# Patient Record
Sex: MALE | Race: WHITE | NOT HISPANIC OR LATINO | URBAN - NONMETROPOLITAN AREA
[De-identification: names, ages, dates, MRNs, and addresses within clinical notes are randomized per-mention and may not be internally consistent; named-entity substitution may affect disease eponyms.]

---

## 2020-09-24 ENCOUNTER — IMPORTED ENCOUNTER (OUTPATIENT)
Dept: URBAN - NONMETROPOLITAN AREA CLINIC 1 | Facility: CLINIC | Age: 65
End: 2020-09-24

## 2020-09-24 PROBLEM — H52.03: Noted: 2020-09-24

## 2020-09-24 PROBLEM — H52.221: Noted: 2020-09-24

## 2020-09-24 PROBLEM — H52.4: Noted: 2020-09-24

## 2020-09-24 PROCEDURE — 92015 DETERMINE REFRACTIVE STATE: CPT

## 2020-09-24 PROCEDURE — 92004 COMPRE OPH EXAM NEW PT 1/>: CPT

## 2020-09-24 NOTE — PATIENT DISCUSSION
Hyperopia/Astigmatism/Presbyopia - new spectacle Rx issued - continue to monitor yearly; 's Notes: MR 9/24/2020DFE 9/24/2020

## 2022-04-10 ASSESSMENT — TONOMETRY
OS_IOP_MMHG: 17
OD_IOP_MMHG: 16

## 2022-04-10 ASSESSMENT — VISUAL ACUITY
OS_CC: 20/20
OU_SC: 20/30
OD_CC: 20/20-1